# Patient Record
Sex: MALE | Race: WHITE | NOT HISPANIC OR LATINO | Employment: OTHER | URBAN - METROPOLITAN AREA
[De-identification: names, ages, dates, MRNs, and addresses within clinical notes are randomized per-mention and may not be internally consistent; named-entity substitution may affect disease eponyms.]

---

## 2024-02-13 ENCOUNTER — HOSPITAL ENCOUNTER (OUTPATIENT)
Facility: HOSPITAL | Age: 69
Discharge: HOME | End: 2024-02-13
Attending: EMERGENCY MEDICINE | Admitting: EMERGENCY MEDICINE
Payer: MEDICARE

## 2024-02-13 ENCOUNTER — APPOINTMENT (OUTPATIENT)
Dept: GENERAL RADIOLOGY | Facility: HOSPITAL | Age: 69
End: 2024-02-13
Payer: MEDICARE

## 2024-02-13 VITALS
TEMPERATURE: 97.2 F | BODY MASS INDEX: 34.53 KG/M2 | HEART RATE: 83 BPM | RESPIRATION RATE: 18 BRPM | WEIGHT: 220 LBS | SYSTOLIC BLOOD PRESSURE: 164 MMHG | DIASTOLIC BLOOD PRESSURE: 74 MMHG | HEIGHT: 67 IN | OXYGEN SATURATION: 99 %

## 2024-02-13 DIAGNOSIS — M25.532 LEFT WRIST PAIN: Primary | ICD-10-CM

## 2024-02-13 PROCEDURE — 73110 X-RAY EXAM OF WRIST: CPT

## 2024-02-13 PROCEDURE — G0463 HOSPITAL OUTPT CLINIC VISIT: HCPCS

## 2024-02-13 PROCEDURE — 73090 X-RAY EXAM OF FOREARM: CPT

## 2024-02-13 RX ORDER — OMEPRAZOLE 20 MG/1
TABLET, DELAYED RELEASE ORAL
COMMUNITY

## 2024-02-13 RX ORDER — VIBEGRON 75 MG/1
75 TABLET, FILM COATED ORAL DAILY
COMMUNITY
Start: 2023-10-01

## 2024-02-13 RX ORDER — KETOCONAZOLE 20 MG/G
1 CREAM TOPICAL DAILY
COMMUNITY

## 2024-02-13 RX ORDER — TESTOSTERONE 20.25 MG/1.25G
GEL TOPICAL
COMMUNITY

## 2024-02-13 RX ORDER — ALBUTEROL SULFATE 90 UG/1
2 AEROSOL, METERED RESPIRATORY (INHALATION)
COMMUNITY

## 2024-02-13 RX ORDER — ATORVASTATIN CALCIUM 20 MG/1
40 TABLET, FILM COATED ORAL DAILY
COMMUNITY

## 2024-02-13 RX ORDER — LISINOPRIL 5 MG/1
5 TABLET ORAL DAILY
COMMUNITY
Start: 2023-12-15

## 2024-02-13 RX ORDER — BUSPIRONE HYDROCHLORIDE 15 MG/1
15 TABLET ORAL 3 TIMES DAILY
COMMUNITY

## 2024-02-13 RX ORDER — HYDROCODONE BITARTRATE AND ACETAMINOPHEN 10; 325 MG/1; MG/1
1 TABLET ORAL EVERY 6 HOURS PRN
COMMUNITY

## 2024-02-13 RX ORDER — CHOLECALCIFEROL (VITAMIN D3) 1250 MCG
50000 CAPSULE ORAL
COMMUNITY

## 2024-02-13 RX ORDER — VITAMIN E 268 MG
CAPSULE ORAL
COMMUNITY

## 2024-02-13 RX ORDER — SILDENAFIL 25 MG/1
TABLET, FILM COATED ORAL
COMMUNITY

## 2024-02-13 RX ORDER — ASPIRIN 325 MG
170 TABLET ORAL DAILY
COMMUNITY

## 2024-02-13 RX ORDER — SERTRALINE HYDROCHLORIDE 100 MG/1
100 TABLET, FILM COATED ORAL DAILY
COMMUNITY
Start: 2024-01-17

## 2024-02-19 ENCOUNTER — OFFICE VISIT (OUTPATIENT)
Dept: ORTHOPEDIC SURGERY | Facility: CLINIC | Age: 69
End: 2024-02-19
Payer: MEDICARE

## 2024-02-19 VITALS — HEIGHT: 67 IN | WEIGHT: 220 LBS | HEART RATE: 78 BPM | BODY MASS INDEX: 34.53 KG/M2 | OXYGEN SATURATION: 97 %

## 2024-02-19 DIAGNOSIS — S66.819A STRAIN OF EXTENSOR POLLICIS LONGUS TENDON: Primary | ICD-10-CM

## 2024-02-19 PROCEDURE — 1160F RVW MEDS BY RX/DR IN RCRD: CPT | Performed by: PHYSICIAN ASSISTANT

## 2024-02-19 PROCEDURE — 99203 OFFICE O/P NEW LOW 30 MIN: CPT | Performed by: PHYSICIAN ASSISTANT

## 2024-02-19 PROCEDURE — 1159F MED LIST DOCD IN RCRD: CPT | Performed by: PHYSICIAN ASSISTANT

## 2024-02-19 RX ORDER — HYDROCODONE BITARTRATE AND ACETAMINOPHEN 7.5; 325 MG/1; MG/1
TABLET ORAL
COMMUNITY
Start: 2024-01-15

## 2024-02-19 RX ORDER — ATORVASTATIN CALCIUM 40 MG/1
40 TABLET, FILM COATED ORAL DAILY
COMMUNITY

## 2024-02-19 RX ORDER — GLIMEPIRIDE 4 MG/1
4 TABLET ORAL DAILY
COMMUNITY
Start: 2023-10-19

## 2024-02-19 RX ORDER — CYCLOBENZAPRINE HCL 10 MG
10 TABLET ORAL
COMMUNITY
Start: 2024-01-15

## 2024-02-19 RX ORDER — OMEPRAZOLE 20 MG/1
20 CAPSULE, DELAYED RELEASE ORAL DAILY
COMMUNITY

## 2024-02-19 NOTE — PROGRESS NOTES
"Yoseph is a 68 y.o. year old male presents to [facility]    Chief Complaint   Patient presents with    Left Wrist - Pain, Initial Evaluation    Left Forearm - Initial Evaluation, Pain       History of Present Illness  Yoseph Benavides III is a 68 y.o. male presents to clinic with his wife, Livier, for evaluation of left wrist pain and lacking extension of the thumb. Reports possibly being bit over the dorsum of the hand a few weeks ago by an insect. States he cleansed it with soap, water and alcohol, but denies any signs of infection. Past treatments include: Wrist splint, rest, ice, elevation. States he only wore the wrist splint for an hour or so, but discontinued due to swelling and pain. States pain increased and lack of thumb abduction strength began afterwards. Wife notes that he had a fluid filled sack over the dorsum of the hand for 1-2 days but has since subsided. Denies any recent trauma to this wrist/hand.     Currently in pain management with [facility] and is prescribed Hydrocodone 7.5mg/325 and flexeril. Of note, started Sinimet for parkinsons disease on 2/5/2024 by his neurologist.The only recent new medication change.     I have reviewed the patient's medical, family, and social history in detail and updated the computerized patient record.    Objective:  Pulse 78   Ht 170.2 cm (67\")   Wt 99.8 kg (220 lb)   SpO2 97%   BMI 34.46 kg/m²      Physical Exam    Vital signs reviewed.   General: No acute distress.  Eyes: conjunctiva clear  ENT: external ears atraumatic  CV: no peripheral edema  Resp: normal respiratory effort  Skin: no rashes or wounds; normal turgor  Psych: mood and affect appropriate; recent and remote memory intact  Neuro: sensation to light touch intact    MSK Exam      Tremor of right hand, lesser on left hand    Left wrist/hand:  Swelling over dorsum of wrist.  Acutely tender over the CMC  CMC grind test positive  Extension lacking to " the thumb by 10 degrees    Radial pulse 1+  Capillary refill less than 2 seconds to all digits      Imaging:  XR Wrist 3+ View Left (02/13/2024 18:11)     Findings:  There is no radiographic evidence of acute fracture or dislocation. Joint spaces are preserved. No focal soft tissue abnormalities identified.     IMPRESSION:  No radiographic evidence of acute fracture or dislocation.    Assessment:  Diagnoses and all orders for this visit:    Strain of extensor pollicis longus tendon  -     Ambulatory Referral to Hand Surgery    Other orders  -     Albuterol Sulfate, sensor, 108 (90 Base) MCG/ACT aerosol powder ; As Needed.  -     cyclobenzaprine (FLEXERIL) 10 MG tablet; Take 1 tablet by mouth.  -     glimepiride (AMARYL) 4 MG tablet; Take 1 tablet by mouth Daily.  -     atorvastatin (LIPITOR) 40 MG tablet; Take 1 tablet by mouth Daily.  -     HYDROcodone-acetaminophen (NORCO) 7.5-325 MG per tablet; Take 1 tablet 3 times a day by oral route as needed for 30 days.  -     omeprazole (priLOSEC) 20 MG capsule; Take 1 capsule by mouth Daily.    Plan: Recommend immobilization with thumbspica, but patient deferred. Recommend referral to [facility] for evaluation of left thumb and wrist tendon.       Follow Up   No follow-ups on file.  Patient was given instructions and counseling regarding his condition or for health maintenance advice. Please see specific information pulled into the AVS if appropriate.     EMR Dragon/Transcription disclaimer:    Much of this encounter note is an electronic transcription/translation of spoken language to printed text.  The electronic translation of spoken language may permit erroneous, or at times, nonsensical words or phrases to be inadvertently transcribed.  Although I have reviewed the note for such errors some may still exist.

## 2025-03-13 ENCOUNTER — HOSPITAL ENCOUNTER (OUTPATIENT)
Facility: HOSPITAL | Age: 70
Discharge: LEFT BEFORE DISCHARGE | End: 2025-03-13
Attending: EMERGENCY MEDICINE | Admitting: EMERGENCY MEDICINE
Payer: MEDICARE

## 2025-03-13 VITALS
OXYGEN SATURATION: 96 % | BODY MASS INDEX: 34.53 KG/M2 | SYSTOLIC BLOOD PRESSURE: 165 MMHG | DIASTOLIC BLOOD PRESSURE: 93 MMHG | RESPIRATION RATE: 22 BRPM | TEMPERATURE: 98.8 F | HEART RATE: 121 BPM | WEIGHT: 220 LBS | HEIGHT: 67 IN

## 2025-03-13 DIAGNOSIS — R00.2 PALPITATIONS: Primary | ICD-10-CM

## 2025-03-13 PROCEDURE — G0463 HOSPITAL OUTPT CLINIC VISIT: HCPCS | Performed by: EMERGENCY MEDICINE

## 2025-03-13 RX ORDER — DONEPEZIL HYDROCHLORIDE 10 MG/1
10 TABLET, FILM COATED ORAL
COMMUNITY
Start: 2025-03-05 | End: 2025-06-03

## 2025-03-13 RX ORDER — LINACLOTIDE 145 UG/1
CAPSULE, GELATIN COATED ORAL
COMMUNITY

## 2025-03-13 RX ORDER — FAMOTIDINE 40 MG/1
TABLET, FILM COATED ORAL
COMMUNITY
Start: 2025-01-25

## 2025-03-13 RX ORDER — DAPAGLIFLOZIN 5 MG/1
5 TABLET, FILM COATED ORAL DAILY
COMMUNITY
Start: 2024-10-22 | End: 2026-01-20

## 2025-03-13 RX ORDER — CHLORAL HYDRATE 500 MG
1 CAPSULE ORAL DAILY
COMMUNITY

## 2025-03-13 RX ORDER — ZINC SULFATE 50(220)MG
220 CAPSULE ORAL DAILY
COMMUNITY

## 2025-03-13 RX ORDER — METOPROLOL SUCCINATE 25 MG/1
12.5 TABLET, EXTENDED RELEASE ORAL DAILY
COMMUNITY
Start: 2024-06-14 | End: 2025-06-09

## 2025-03-13 RX ORDER — TESTOSTERONE CYPIONATE 200 MG/ML
INJECTION, SOLUTION INTRAMUSCULAR
COMMUNITY
Start: 2024-12-11

## 2025-03-13 NOTE — ED NOTES
wife reports pt came today stating he didn't feel good and he took his vitals showing an elevated HR at home which is not normal for pt. Wife reports pt has also been more SOB as well. no hx of a-fib but does report heart attack and cardiac stent. Pt refusing IV and blood work in triage. Has a hx of parkinsons as well. Reviewed pt's home meds with wife in triage.

## 2025-03-13 NOTE — FSED PROVIDER NOTE
Subjective   History of Present Illness    Review of Systems    Past Medical History:   Diagnosis Date    Constipation     Diabetes     High cholesterol     History of stomach ulcers     Hypertension     Low testosterone     Parkinsons 02/2024    Severe anxiety        No Known Allergies    No past surgical history on file.    No family history on file.    Social History     Socioeconomic History    Marital status:    Tobacco Use    Smoking status: Former     Types: Cigarettes    Smokeless tobacco: Never   Vaping Use    Vaping status: Never Used   Substance and Sexual Activity    Alcohol use: Not Currently    Drug use: Never    Sexual activity: Defer           Objective   Physical Exam    Procedures           ED Course                                           Medical Decision Making  Pt presents with tachycardia with his wife, he feels like he got overheated, he was placed on the monitor which showed a fast heart rate that looked like afib, he advised me and several Rns we would not be getting any blood or placing an iv, his wife is worried about him and insisted he seek care, I spoke with her in the hallway and she repeated her concerns, again I went to talk to the pt and advised that I felt his wife's concern was valid and he likely needed a w/u but that would involve meds, iv, and cxr to name a few, again he refused these stating he was fine, pt is alert and oriented and in no distress, I advised wife I cannot proceed without his consent even if it is not the best decision, r/b/a were discussed, I advised pt we are always here or he may seek care at another ED if he so desires  NO exam or ROS performed on patient  He is AMA     Amount and/or Complexity of Data Reviewed  ECG/medicine tests: ordered.        Final diagnoses:   Palpitations       ED Disposition  ED Disposition       ED Disposition   AMA    Condition   --    Comment   --               Barbie Amaya, Agata Jones, APRN  [address]  [facility] [id number]  [phone]    In 1 day           Medication List      No changes were made to your prescriptions during this visit.